# Patient Record
Sex: FEMALE | Race: BLACK OR AFRICAN AMERICAN | NOT HISPANIC OR LATINO | URBAN - METROPOLITAN AREA
[De-identification: names, ages, dates, MRNs, and addresses within clinical notes are randomized per-mention and may not be internally consistent; named-entity substitution may affect disease eponyms.]

---

## 2020-05-13 ENCOUNTER — EMERGENCY (EMERGENCY)
Facility: HOSPITAL | Age: 60
LOS: 1 days | Discharge: HOME | End: 2020-05-13
Attending: EMERGENCY MEDICINE | Admitting: EMERGENCY MEDICINE
Payer: MEDICAID

## 2020-05-13 VITALS
RESPIRATION RATE: 18 BRPM | SYSTOLIC BLOOD PRESSURE: 173 MMHG | OXYGEN SATURATION: 98 % | HEART RATE: 115 BPM | TEMPERATURE: 98 F | DIASTOLIC BLOOD PRESSURE: 102 MMHG | WEIGHT: 160.06 LBS | HEIGHT: 62 IN

## 2020-05-13 LAB
ALBUMIN SERPL ELPH-MCNC: 4.4 G/DL — SIGNIFICANT CHANGE UP (ref 3.3–5)
ALP SERPL-CCNC: 70 U/L — SIGNIFICANT CHANGE UP (ref 40–120)
ALT FLD-CCNC: 44 U/L — SIGNIFICANT CHANGE UP (ref 10–45)
ANION GAP SERPL CALC-SCNC: 14 MMOL/L — SIGNIFICANT CHANGE UP (ref 5–17)
APPEARANCE UR: CLEAR — SIGNIFICANT CHANGE UP
AST SERPL-CCNC: 27 U/L — SIGNIFICANT CHANGE UP (ref 10–40)
BASOPHILS # BLD AUTO: 0.05 K/UL — SIGNIFICANT CHANGE UP (ref 0–0.2)
BASOPHILS NFR BLD AUTO: 0.7 % — SIGNIFICANT CHANGE UP (ref 0–2)
BILIRUB SERPL-MCNC: 0.2 MG/DL — SIGNIFICANT CHANGE UP (ref 0.2–1.2)
BILIRUB UR-MCNC: NEGATIVE — SIGNIFICANT CHANGE UP
BUN SERPL-MCNC: 8 MG/DL — SIGNIFICANT CHANGE UP (ref 7–23)
CALCIUM SERPL-MCNC: 9.9 MG/DL — SIGNIFICANT CHANGE UP (ref 8.4–10.5)
CHLORIDE SERPL-SCNC: 99 MMOL/L — SIGNIFICANT CHANGE UP (ref 96–108)
CO2 SERPL-SCNC: 27 MMOL/L — SIGNIFICANT CHANGE UP (ref 22–31)
COLOR SPEC: YELLOW — SIGNIFICANT CHANGE UP
CREAT SERPL-MCNC: 0.62 MG/DL — SIGNIFICANT CHANGE UP (ref 0.5–1.3)
DIFF PNL FLD: NEGATIVE — SIGNIFICANT CHANGE UP
EOSINOPHIL # BLD AUTO: 0.38 K/UL — SIGNIFICANT CHANGE UP (ref 0–0.5)
EOSINOPHIL NFR BLD AUTO: 5.6 % — SIGNIFICANT CHANGE UP (ref 0–6)
GLUCOSE SERPL-MCNC: 205 MG/DL — HIGH (ref 70–99)
GLUCOSE UR QL: NEGATIVE — SIGNIFICANT CHANGE UP
HCT VFR BLD CALC: 43.9 % — SIGNIFICANT CHANGE UP (ref 34.5–45)
HGB BLD-MCNC: 14.2 G/DL — SIGNIFICANT CHANGE UP (ref 11.5–15.5)
IMM GRANULOCYTES NFR BLD AUTO: 0.3 % — SIGNIFICANT CHANGE UP (ref 0–1.5)
KETONES UR-MCNC: NEGATIVE — SIGNIFICANT CHANGE UP
LEUKOCYTE ESTERASE UR-ACNC: NEGATIVE — SIGNIFICANT CHANGE UP
LYMPHOCYTES # BLD AUTO: 2.87 K/UL — SIGNIFICANT CHANGE UP (ref 1–3.3)
LYMPHOCYTES # BLD AUTO: 42.6 % — SIGNIFICANT CHANGE UP (ref 13–44)
MCHC RBC-ENTMCNC: 29.1 PG — SIGNIFICANT CHANGE UP (ref 27–34)
MCHC RBC-ENTMCNC: 32.3 GM/DL — SIGNIFICANT CHANGE UP (ref 32–36)
MCV RBC AUTO: 90 FL — SIGNIFICANT CHANGE UP (ref 80–100)
MONOCYTES # BLD AUTO: 0.37 K/UL — SIGNIFICANT CHANGE UP (ref 0–0.9)
MONOCYTES NFR BLD AUTO: 5.5 % — SIGNIFICANT CHANGE UP (ref 2–14)
NEUTROPHILS # BLD AUTO: 3.04 K/UL — SIGNIFICANT CHANGE UP (ref 1.8–7.4)
NEUTROPHILS NFR BLD AUTO: 45.3 % — SIGNIFICANT CHANGE UP (ref 43–77)
NITRITE UR-MCNC: NEGATIVE — SIGNIFICANT CHANGE UP
NRBC # BLD: 0 /100 WBCS — SIGNIFICANT CHANGE UP (ref 0–0)
PH UR: 6 — SIGNIFICANT CHANGE UP (ref 5–8)
PLATELET # BLD AUTO: 223 K/UL — SIGNIFICANT CHANGE UP (ref 150–400)
POTASSIUM SERPL-MCNC: 4.2 MMOL/L — SIGNIFICANT CHANGE UP (ref 3.5–5.3)
POTASSIUM SERPL-SCNC: 4.2 MMOL/L — SIGNIFICANT CHANGE UP (ref 3.5–5.3)
PROT SERPL-MCNC: 7.8 G/DL — SIGNIFICANT CHANGE UP (ref 6–8.3)
PROT UR-MCNC: NEGATIVE MG/DL — SIGNIFICANT CHANGE UP
RBC # BLD: 4.88 M/UL — SIGNIFICANT CHANGE UP (ref 3.8–5.2)
RBC # FLD: 13.3 % — SIGNIFICANT CHANGE UP (ref 10.3–14.5)
SODIUM SERPL-SCNC: 140 MMOL/L — SIGNIFICANT CHANGE UP (ref 135–145)
SP GR SPEC: >=1.03 — SIGNIFICANT CHANGE UP (ref 1–1.03)
TROPONIN T SERPL-MCNC: <0.01 NG/ML — SIGNIFICANT CHANGE UP (ref 0–0.01)
UROBILINOGEN FLD QL: 0.2 E.U./DL — SIGNIFICANT CHANGE UP
WBC # BLD: 6.73 K/UL — SIGNIFICANT CHANGE UP (ref 3.8–10.5)
WBC # FLD AUTO: 6.73 K/UL — SIGNIFICANT CHANGE UP (ref 3.8–10.5)

## 2020-05-13 PROCEDURE — 93010 ELECTROCARDIOGRAM REPORT: CPT

## 2020-05-13 PROCEDURE — 99285 EMERGENCY DEPT VISIT HI MDM: CPT | Mod: 25

## 2020-05-13 PROCEDURE — 74019 RADEX ABDOMEN 2 VIEWS: CPT | Mod: 26

## 2020-05-13 PROCEDURE — 71045 X-RAY EXAM CHEST 1 VIEW: CPT | Mod: 26

## 2020-05-13 RX ORDER — FAMOTIDINE 10 MG/ML
20 INJECTION INTRAVENOUS ONCE
Refills: 0 | Status: COMPLETED | OUTPATIENT
Start: 2020-05-13 | End: 2020-05-13

## 2020-05-13 RX ORDER — ACETAMINOPHEN 500 MG
650 TABLET ORAL ONCE
Refills: 0 | Status: COMPLETED | OUTPATIENT
Start: 2020-05-13 | End: 2020-05-13

## 2020-05-13 NOTE — ED ADULT NURSE NOTE - NSIMPLEMENTINTERV_GEN_ALL_ED
Insulin pump will be resumed tomorrow at 8am with a temporary basal of 80% for 6 hours. Insulin pump forms to be completed by pt and attestation form to be signed by physician  S/S, prevention and appropriate treatment of hypo-hyperglycemia reviewed with pt and verbalized understanding obtained via the teach-back method.  Once insulin pump is resumes Primary RN to f/u with BG monitoring, making sure pt consumes carb consistent meals, insulin pump site assessment (once insulin pump resumed), monitoring S/S hypo/hyperglycemia and tracking carb intake, meal/correction boluses (once pump resumed). Implemented All Universal Safety Interventions:  [place] to call system. Call bell, personal items and telephone within reach. Instruct patient to call for assistance. Room bathroom lighting operational. Non-slip footwear when patient is off stretcher. Physically safe environment: no spills, clutter or unnecessary equipment. Stretcher in lowest position, wheels locked, appropriate side rails in place.

## 2020-05-13 NOTE — ED PROVIDER NOTE - CARE PLAN
Principal Discharge DX:	Chest pain Principal Discharge DX:	Chest pain  Secondary Diagnosis:	Hypertension

## 2020-05-13 NOTE — ED ADULT NURSE NOTE - OBJECTIVE STATEMENT
58 yo F pmhx asthma, DM, GERD sent to ED from  co chest discomfort/ epigastric burning since this AM. Pt reports intermittent epigastric pain radiating to chest discomfort intermittently, "for a while now I have gerd and sometimes take pepcid sometimes don't but I didn't know what this was." EKG completed upon arrival. Pt received 2tabs of 81mg ASA PTA via , and upon arrival describes 2/10 chest discomfort stating that pain has mostly resolved. PT also reports "using my inhaler and nebulizer so much since covid because im scared." Pt AOx4, speaking in clear and coherent sentences, ambulates with steady gait from front triage. placed on [facility], labs collected. Denies SOB, recent fevers, chills, N/V/D, back pain, neck pain, headache, dizziness, blurred vision, exposure to known sick contacts.

## 2020-05-13 NOTE — ED ADULT NURSE NOTE - CHIEF COMPLAINT QUOTE
Presents to ED for chest discomfort this AM.  States pain was located sternally which she was seen at [facility] today.  Was given 2x 81mg ASA prior to arrival.

## 2020-05-13 NOTE — ED ADULT NURSE NOTE - NEURO GAIT
HPI     Blur ou at dist/near x mos, no assoc pain or red, constant, no relief   over time.  Using systane balance 2x/day    Last edited by Tone Diaz, OD on 11/5/2018  1:21 PM. (History)            Assessment /Plan     For exam results, see Encounter Report.    Astigmatism with presbyopia, bilateral    Dry eyes, bilateral    Nuclear sclerosis, bilateral    Open angle with borderline findings and low glaucoma risk in both eyes      1. Spec Rx given. Different lens options discussed with patient. RTC 1 year full exam.  2. Cont with systane balance.  3. Educated pt on presence of cataracts and effects on vision. No surgery at this time. Recheck in one year.  4. IOP low and fine for CD, pt does not want any testing that is not covered by insurance, CD ratio stable, pachy normal, no fam history of glaucoma. RTC yearly.              
steady

## 2020-05-13 NOTE — ED ADULT TRIAGE NOTE - CHIEF COMPLAINT QUOTE
Presents to ED for chest discomfort this AM.  States pain was located sternally which she was seen at [facility] today.  Was given 2x 81mg ASA prior to arrival.

## 2020-05-13 NOTE — ED PROVIDER NOTE - CLINICAL SUMMARY MEDICAL DECISION MAKING FREE TEXT BOX
Pt with chest pain, now resolved. Will check labs, CXR, EKG. Re-assess. Pt with chest pain, now resolved. Will check labs, CXR, EKG. Pepcid ordered- pt declined in ED. Re-assess.  On re-evaluation, pt is AAox3 and in NAD. Pt well appearing in ED.   Pt discloses she was given a bowel regimen for her constipation, however has not started yet. Pt expresses concern due to rectal pressure and intermittent lower back pain. Abdominal xray done- large amount of stool, nonobstructive pattern. Pt has an appointment tomorrow with GI and Friday with pmd. Instructions given to keep appointments, return to ED for worsening condition. Pt expresses understanding.

## 2020-05-13 NOTE — ED PROVIDER NOTE - OBJECTIVE STATEMENT
Pt is a 59F who presents to ED for chest discomfort. Pt states she has been having generalized chest discomfort for the past day. Pt states she has reflux and was unsure if her symptoms were related to reflux or cardiac pain. Pt states she has had chest pain in the past, had nuclear stress test and echo 1 year ago which was normal. Pt was seen at urgent care and sent to ED for evaluation. Pt has hx of asthma and diabetes. Pt states she has been taking her albuterol at least 3 times a day for the past 3 days due to worry over coronavirus. Pt has no shortness of breath, no wheezing. Pt speaking in full sentences, in NAD.

## 2020-05-13 NOTE — ED PROVIDER NOTE - PATIENT PORTAL LINK FT
You can access the FollowMyHealth Patient Portal offered by [facility] by registering at the following website: [email or web address]. By joining [facility]’s FollowMyHealth portal, you will also be able to view your health information using other applications (apps) compatible with our system.

## 2020-05-13 NOTE — ED PROVIDER NOTE - NSFOLLOWUPINSTRUCTIONS_ED_ALL_ED_FT
Chest Pain    Chest pain can be caused by many different conditions which may or may not be dangerous. Causes include heartburn, lung infections, heart attack, blood clot in lungs, skin infections, strain or damage to muscle, cartilage, or bones, etc. In addition to a history and physical examination, an electrocardiogram (ECG) or other lab tests may have been performed to determine the cause of your chest pain. Follow up with your primary care provider or with a cardiologist as instructed.     SEEK IMMEDIATE MEDICAL CARE IF YOU HAVE ANY OF THE FOLLOWING SYMPTOMS: worsening chest pain, coughing up blood, unexplained back/neck/jaw pain, severe abdominal pain, dizziness or lightheadedness, fainting, shortness of breath, sweaty or clammy skin, vomiting, or racing heart beat. These symptoms may represent a serious problem that is an emergency. Do not wait to see if the symptoms will go away. Get medical help right away. Call 911 and do not drive yourself to the hospital.    Gastroesophageal Reflux Disease, Adult    Gastroesophageal reflux (GO) happens when acid from the stomach flows up into the tube that connects the mouth and the stomach (esophagus). Normally, food travels down the esophagus and stays in the stomach to be digested. However, when a person has GO, food and stomach acid sometimes move back up into the esophagus. If this becomes a more serious problem, the person may be diagnosed with a disease called gastroesophageal reflux disease (GERD). GERD occurs when the reflux:  Happens often. Causes frequent or severe symptoms. Causes problems such as damage to the esophagus.When stomach acid comes in contact with the esophagus, the acid may cause soreness (inflammation) in the esophagus. Over time, GERD may create small holes (ulcers) in the lining of the esophagus.  What are the causes?  This condition is caused by a problem with the muscle between the esophagus and the stomach (lower esophageal sphincter, or LES). Normally, the LES muscle closes after food passes through the esophagus to the stomach. When the LES is weakened or abnormal, it does not close properly, and that allows food and stomach acid to go back up into the esophagus.  The LES can be weakened by certain dietary substances, medicines, and medical conditions, including:  Tobacco use.Pregnancy.Having a hiatal hernia.Alcohol use.Certain foods and beverages, such as coffee, chocolate, onions, and peppermint.What increases the risk?  You are more likely to develop this condition if you:  Have an increased body weight.Have a connective tissue disorder.Use NSAID medicines.What are the signs or symptoms?  Symptoms of this condition include:  Heartburn.Difficult or painful swallowing.The feeling of having a lump in the throat.A bitter taste in the mouth.Bad breath.Having a large amount of saliva.Having an upset or bloated stomach.Belching.Chest pain. Different conditions can cause chest pain. Make sure you see your health care provider if you experience chest pain.Shortness of breath or wheezing.Ongoing (chronic) cough or a night-time cough.Wearing away of tooth enamel.Weight loss.How is this diagnosed?  Your health care provider will take a medical history and perform a physical exam. To determine if you have mild or severe GERD, your health care provider may also monitor how you respond to treatment. You may also have tests, including:  A test to examine your stomach and esophagus with a small camera (endoscopy).A test that measures the acidity level in your esophagus.A test that measures how much pressure is on your esophagus.A barium swallow or modified barium swallow test to show the shape, size, and functioning of your esophagus.How is this treated?  The goal of treatment is to help relieve your symptoms and to prevent complications. Treatment for this condition may vary depending on how severe your symptoms are. Your health care provider may recommend:  Changes to your diet.Medicine.Surgery.Follow these instructions at home:  Eating and drinking        Follow a diet as recommended by your health care provider. This may involve avoiding foods and drinks such as:  Coffee and tea (with or without caffeine).Drinks that contain alcohol.Energy drinks and sports drinks.Carbonated drinks or sodas.Chocolate and cocoa.Peppermint and mint flavorings.Garlic and onions.Horseradish.Spicy and acidic foods, including peppers, chili powder, vernon powder, vinegar, hot sauces, and barbecue sauce.Citrus fruit juices and citrus fruits, such as oranges, giselle, and limes.Tomato-based foods, such as red sauce, chili, salsa, and pizza with red sauce.Fried and fatty foods, such as donuts, french fries, potato chips, and high-fat dressings.High-fat meats, such as hot dogs and fatty cuts of red and white meats, such as rib eye steak, sausage, ham, and talavera.High-fat dairy items, such as whole milk, butter, and cream cheese.Eat small, frequent meals instead of large meals.Avoid drinking large amounts of liquid with your meals.Avoid eating meals during the 2–3 hours before bedtime.Avoid lying down right after you eat.Do not exercise right after you eat.Lifestyle        Do not use any products that contain nicotine or tobacco, such as cigarettes, e-cigarettes, and chewing tobacco. If you need help quitting, ask your health care provider.Try to reduce your stress by using methods such as yoga or meditation. If you need help reducing stress, ask your health care provider.If you are overweight, reduce your weight to an amount that is healthy for you. Ask your health care provider for guidance about a safe weight loss goal.General instructions     Pay attention to any changes in your symptoms.Take over-the-counter and prescription medicines only as told by your health care provider. Do not take aspirin, ibuprofen, or other NSAIDs unless your health care provider told you to do so.Wear loose-fitting clothing. Do not wear anything tight around your waist that causes pressure on your abdomen.Raise (elevate) the head of your bed about 6 inches (15 cm).Avoid bending over if this makes your symptoms worse.Keep all follow-up visits as told by your health care provider. This is important.Contact a health care provider if:  You have:  New symptoms.Unexplained weight loss.Difficulty swallowing or it hurts to swallow.Wheezing or a persistent cough.A hoarse voice.Your symptoms do not improve with treatment.Get help right away if you:  Have pain in your arms, neck, jaw, teeth, or back.Feel sweaty, dizzy, or light-headed.Have chest pain or shortness of breath.Vomit and your vomit looks like blood or coffee grounds.Faint.Have stool that is bloody or black.Cannot swallow, drink, or eat.Summary  Gastroesophageal reflux happens when acid from the stomach flows up into the esophagus. GERD is a disease in which the reflux happens often, causes frequent or severe symptoms, or causes problems such as damage to the esophagus.Treatment for this condition may vary depending on how severe your symptoms are. Your health care provider may recommend diet and lifestyle changes, medicine, or surgery.Contact a health care provider if you have new or worsening symptoms.Take over-the-counter and prescription medicines only as told by your health care provider. Do not take aspirin, ibuprofen, or other NSAIDs unless your health care provider told you to do so.Keep all follow-up visits as told by your health care provider. This is important.This information is not intended to replace advice given to you by your health care provider. Make sure you discuss any questions you have with your health care provider.    Document Released: 09/27/2006 Document Revised: 06/26/2019 Document Reviewed: 06/26/2019  Elsevier Patient Education © 2020 [employer]    Hypertension    Hypertension, commonly called high blood pressure, is when the force of blood pumping through your arteries is too strong. Hypertension forces your heart to work harder to pump blood. Your arteries may become narrow or stiff. Having untreated or uncontrolled hypertension for a long period of time can cause heart attack, stroke, kidney disease, and other problems. If started on a medication, take exactly as prescribed by your health care professional. Maintain a healthy lifestyle and follow up with your primary care physician.    SEEK IMMEDIATE MEDICAL CARE IF YOU HAVE ANY OF THE FOLLOWING SYMPTOMS: severe headache, confusion, chest pain, abdominal pain, vomiting, or shortness of breath.

## 2020-05-14 VITALS
TEMPERATURE: 98 F | DIASTOLIC BLOOD PRESSURE: 64 MMHG | RESPIRATION RATE: 18 BRPM | SYSTOLIC BLOOD PRESSURE: 176 MMHG | HEART RATE: 105 BPM | OXYGEN SATURATION: 98 %

## 2020-05-14 PROCEDURE — 80053 COMPREHEN METABOLIC PANEL: CPT

## 2020-05-14 PROCEDURE — 83690 ASSAY OF LIPASE: CPT

## 2020-05-14 PROCEDURE — 82550 ASSAY OF CK (CPK): CPT

## 2020-05-14 PROCEDURE — 82553 CREATINE MB FRACTION: CPT

## 2020-05-14 PROCEDURE — 93005 ELECTROCARDIOGRAM TRACING: CPT

## 2020-05-14 PROCEDURE — 74019 RADEX ABDOMEN 2 VIEWS: CPT

## 2020-05-14 PROCEDURE — 81003 URINALYSIS AUTO W/O SCOPE: CPT

## 2020-05-14 PROCEDURE — 74019 RADEX ABDOMEN 2 VIEWS: CPT | Mod: 26

## 2020-05-14 PROCEDURE — 71045 X-RAY EXAM CHEST 1 VIEW: CPT

## 2020-05-14 PROCEDURE — 85025 COMPLETE CBC W/AUTO DIFF WBC: CPT

## 2020-05-14 PROCEDURE — 84484 ASSAY OF TROPONIN QUANT: CPT

## 2020-05-14 PROCEDURE — 36415 COLL VENOUS BLD VENIPUNCTURE: CPT

## 2020-05-14 PROCEDURE — 99284 EMERGENCY DEPT VISIT MOD MDM: CPT | Mod: 25

## 2020-05-14 NOTE — ED ADULT NURSE REASSESSMENT NOTE - NS ED NURSE REASSESS COMMENT FT1
Patient returns from XRay denying any pain or changes in sx. in NAD at this time, connected to [facility].

## 2020-05-17 DIAGNOSIS — R07.89 OTHER CHEST PAIN: ICD-10-CM

## 2020-05-17 DIAGNOSIS — I10 ESSENTIAL (PRIMARY) HYPERTENSION: ICD-10-CM

## 2020-05-29 ENCOUNTER — EMERGENCY (EMERGENCY)
Facility: HOSPITAL | Age: 60
LOS: 1 days | Discharge: HOME | End: 2020-05-29
Attending: EMERGENCY MEDICINE | Admitting: EMERGENCY MEDICINE
Payer: MEDICAID

## 2020-05-29 VITALS
HEIGHT: 65 IN | DIASTOLIC BLOOD PRESSURE: 94 MMHG | TEMPERATURE: 98 F | HEART RATE: 100 BPM | WEIGHT: 156.09 LBS | RESPIRATION RATE: 16 BRPM | OXYGEN SATURATION: 98 % | SYSTOLIC BLOOD PRESSURE: 156 MMHG

## 2020-05-29 VITALS
TEMPERATURE: 98 F | DIASTOLIC BLOOD PRESSURE: 87 MMHG | OXYGEN SATURATION: 99 % | SYSTOLIC BLOOD PRESSURE: 147 MMHG | RESPIRATION RATE: 18 BRPM | HEART RATE: 89 BPM

## 2020-05-29 DIAGNOSIS — M54.2 CERVICALGIA: ICD-10-CM

## 2020-05-29 DIAGNOSIS — Z88.8 ALLERGY STATUS TO OTHER DRUGS, MEDICAMENTS AND BIOLOGICAL SUBSTANCES STATUS: ICD-10-CM

## 2020-05-29 DIAGNOSIS — N75.0 CYST OF BARTHOLIN'S GLAND: ICD-10-CM

## 2020-05-29 DIAGNOSIS — M79.10 MYALGIA, UNSPECIFIED SITE: ICD-10-CM

## 2020-05-29 PROBLEM — J45.909 UNSPECIFIED ASTHMA, UNCOMPLICATED: Chronic | Status: ACTIVE | Noted: 2020-05-13

## 2020-05-29 PROBLEM — E11.9 TYPE 2 DIABETES MELLITUS WITHOUT COMPLICATIONS: Chronic | Status: ACTIVE | Noted: 2020-05-13

## 2020-05-29 LAB
APPEARANCE UR: CLEAR — SIGNIFICANT CHANGE UP
BILIRUB UR-MCNC: NEGATIVE — SIGNIFICANT CHANGE UP
COLOR SPEC: YELLOW — SIGNIFICANT CHANGE UP
DIFF PNL FLD: NEGATIVE — SIGNIFICANT CHANGE UP
GLUCOSE UR QL: NEGATIVE — SIGNIFICANT CHANGE UP
KETONES UR-MCNC: NEGATIVE — SIGNIFICANT CHANGE UP
LEUKOCYTE ESTERASE UR-ACNC: NEGATIVE — SIGNIFICANT CHANGE UP
NITRITE UR-MCNC: NEGATIVE — SIGNIFICANT CHANGE UP
PH UR: 6 — SIGNIFICANT CHANGE UP (ref 5–8)
PROT UR-MCNC: NEGATIVE MG/DL — SIGNIFICANT CHANGE UP
SP GR SPEC: 1.02 — SIGNIFICANT CHANGE UP (ref 1–1.03)
UROBILINOGEN FLD QL: 0.2 E.U./DL — SIGNIFICANT CHANGE UP

## 2020-05-29 PROCEDURE — 88304 TISSUE EXAM BY PATHOLOGIST: CPT

## 2020-05-29 PROCEDURE — 87086 URINE CULTURE/COLONY COUNT: CPT

## 2020-05-29 PROCEDURE — 88304 TISSUE EXAM BY PATHOLOGIST: CPT | Mod: 26

## 2020-05-29 PROCEDURE — 87075 CULTR BACTERIA EXCEPT BLOOD: CPT

## 2020-05-29 PROCEDURE — 87186 SC STD MICRODIL/AGAR DIL: CPT

## 2020-05-29 PROCEDURE — 87070 CULTURE OTHR SPECIMN AEROBIC: CPT

## 2020-05-29 PROCEDURE — 81003 URINALYSIS AUTO W/O SCOPE: CPT

## 2020-05-29 PROCEDURE — 99284 EMERGENCY DEPT VISIT MOD MDM: CPT | Mod: 25

## 2020-05-29 PROCEDURE — 99284 EMERGENCY DEPT VISIT MOD MDM: CPT

## 2020-05-29 NOTE — ED PROVIDER NOTE - PATIENT PORTAL LINK FT
You can access the FollowMyHealth Patient Portal offered by [facility] by registering at the following website: [email or web address]. By joining [facility]’s FollowMyHealth portal, you will also be able to view your health information using other applications (apps) compatible with our system.

## 2020-05-29 NOTE — ED PROVIDER NOTE - ATTENDING CONTRIBUTION TO CARE
discomfort in left side of neck after sleeping funny and painful swelling on vagina.  neck pain appears musculoskeletal.  reproducible with movement/ palpation.  concern for bartholin abscess.  gyn consulted and performed drainage.  home with abx, outpatient f/u.  no systemic symptoms of infection

## 2020-05-29 NOTE — CONSULT NOTE ADULT - SUBJECTIVE AND OBJECTIVE BOX
59y  Menopausal since 2015 presenting with neck pain but mentioned "swelling in her vagina".  GYN team was called to assess a Right Bartholin's cyst.  she report the cyst started growing a few days prior to her visit. Last week was treated with Bactrim for presumed UTI (but cultures came back negative).   Her D.M II is only partial controlled.  She's endorses pain, but denies fever, chills, chest pain, palpitations, SOB, n/v.  +flatus, +BM    OB H/x:     x3  vTOP w/ D&C   MA     GYN H/x:  Prior episode of Bartholin's cyst with spontaneous drainge few years ago.    MED H/x:  Diabetes mellitus )last Hb A1c - 10.4% (2020).  Asthma  GERD    SURG H/x:    Medications:  Insulin  Abuterol  Pepcid    Allergies:   NKDA    Vital Signs Last 24 Hrs  T(C): 36.6 (29 May 2020 15:13), Max: 36.7 (29 May 2020 11:59)  T(F): 97.9 (29 May 2020 15:13), Max: 98 (29 May 2020 11:59)  HR: 89 (29 May 2020 15:13) (89 - 100)  BP: 147/87 (29 May 2020 15:13) (147/87 - 156/94)  RR: 18 (29 May 2020 15:13) (16 - 18)  SpO2: 99% (29 May 2020 15:13) (98% - 99%)    Physical Exam:  Gen: NAD, comfortable  GI: soft, nontender, nondistended + BS, no rebound no guarding  BME: normal anteverted uterus, no adnexal masses appreciated , No cervical motion tenderness   Right Bartholin's cyst/abscess 3-4cm very tender to touch, no pigmentation or spontaneous drainage.   Ext: no edema, erythema, tenderness       Urinalysis Basic - ( 29 May 2020 13:55 )    Color: Yellow / Appearance: Clear / S.020 / pH: x  Gluc: x / Ketone: NEGATIVE  / Bili: Negative / Urobili: 0.2 E.U./dL   Blood: x / Protein: NEGATIVE mg/dL / Nitrite: NEGATIVE   Leuk Esterase: NEGATIVE / RBC: x / WBC x   Sq Epi: x / Non Sq Epi: x / Bacteria: x

## 2020-05-29 NOTE — ED PROVIDER NOTE - OBJECTIVE STATEMENT
59F w/hx of HTN, DM, asthma here for evaluation of left sided neck pain and "bump" in my vagina.  Patient states she slept on several large pillows and may have "pulled" her neck but wanted to make sure "I am ok." Denies any headache, visual disturbances, stiff neck, fever, chills, chest pain, shortness of breath. She has taken ibuprofen to some relief.  Also c/o painful, enlarging bump on vagina for months. States she has felt this but as it was increasing in size she became worried. She has called her GYN office but was unable to secure an appointment.

## 2020-05-29 NOTE — ED PROVIDER NOTE - NSFOLLOWUPINSTRUCTIONS_ED_ALL_ED_FT
TAKE ANTIBIOTICS AS PRESCRIBED AND USE TYLENOL AS NEEDED FOR PAIN.  PLEASE FOLLOW UP WITH YOUR GYN IN 7-10 DAYS.    A Bartholin's cyst is a fluid-filled sac that forms on a Bartholin's gland. Bartholin's glands are small glands in the folds of skin around the vaginal opening (labia). These glands produce a fluid to moisten (lubricate) the outside of the vagina during sex.  A cyst that is not large or infected may not cause any problems or require treatment. If the cyst gets infected, it is called a Bartholin's abscess. An abscess may cause symptoms such as pain and swelling and is more likely to require treatment.  What are the causes?  This condition may be caused by a blocked Bartholin's gland. These glands can become blocked due to natural buildup of fluid and oils. Bacteria inside of the cyst can cause infection.  In many cases, the cause is not known.  What increases the risk?  You may be at increased risk of developing a Bartholin's cyst or abscess if:  You are of childbearing age.You have a history of Bartholin's cysts or abscesses.You have diabetes.You have an STI (sexually transmitted infection).What are the signs or symptoms?  Symptoms may include:  A bulge or lump on the labia, near the lower opening of the vagina.Discomfort or pain. This may get worse during sex or when walking.Redness, swelling, or fluid draining from the area. These may be signs of an abscess.How severe your symptoms are depends on the size of your cyst and whether it is infected. Infection causes symptoms to get more severe.  How is this diagnosed?  This condition may be diagnosed based on:  Your symptoms and medical history.A physical exam to check for swelling in your vaginal area. You may lie on your back on an exam table and have your feet placed into footrests for the exam.Blood tests to check for infections.Removal of a fluid sample from the cyst or abscess (biopsy) for testing.You may work with a health care provider who specializes in women's health (gynecologist) for diagnosis and treatment.  How is this treated?  If your cyst is small, not infected, and not causing symptoms, you may not need any treatment. These cysts often go away on their own, with home care such as hot baths or warm compresses.  If you have a large cyst or an abscess, treatment may include:  Antibiotic medicine.A procedure to drain the fluid inside the cyst or abscess. These procedures involve making an incision in the cyst or abscess so that the fluid drains out, and then one of the following may be done:  A small, thin tube (catheter) may be placed inside the cyst or abscess so that it does not close and fill up with fluid again (fistulization). The catheter will be removed at a follow-up visit.The edges of the incision may be stitched to your skin so that the cyst or abscess stays open (marsupialization). This allows it to continue to drain and not fill up with fluid again.If you have cysts or abscesses that keep returning (recurring) and have required incision and drainage multiple times, your health care provider may talk with you about surgery to remove the Bartholin's gland.  Follow these instructions at home:  Medicines     Take over-the-counter and prescription medicines only as told by your health care provider.If you were prescribed an antibiotic medicine, take it as told by your health care provider. Do not stop taking the antibiotic even if your condition improves.Managing pain and swelling     Try sitz baths to help with pain and swelling. A sitz bath is a warm water bath in which the water only comes up to your hips and should cover your buttocks. You may take sitz baths several times a day.Apply heat to the affected area as often as needed. Use the heat source that your health care provider recommends, such as a moist heat pack or a heating pad.   Place a towel between your skin and the heat source. Leave the heat on for 20–30 minutes. Remove the heat if your skin turns bright red. This is especially important if you are unable to feel pain, heat, or cold. You may have a greater risk of getting burned.General instructions     If your cyst or abscess was drained, follow instructions from your health care provider about how to take care of your wound. Use feminine pads as needed to absorb any drainage.Do not push on or squeeze your cyst.Do not have sex until the cyst has gone away or your wound from drainage has healed.Take these steps to help prevent a Bartholin's cyst from returning, and to prevent other Bartholin's cysts from developing:  Take a bath or shower once a day. Clean your vaginal area with mild soap and water when you bathe.Practice safe sex to prevent STIs. Talk with your health care provider about how to prevent STIs and which forms of birth control (contraception) may be best for you.Keep all follow-up visits as told by your health care provider. This is important.Contact a health care provider if:  You have a fever.You develop redness, swelling, or pain around your cyst.You have fluid, blood, pus, or a bad smell coming from your cyst.You have a cyst that gets larger or comes back.Summary  A Bartholin's cyst is a fluid-filled sac that forms on a Bartholin's gland. These glands are in the folds of skin around the vaginal opening (labia).If your cyst is small, not infected, and not causing symptoms, you may not need any treatment. These cysts often go away on their own, with home care such as hot baths or warm compresses.If you have a large cyst or an abscess, your health care provider may perform a procedure to drain the fluid.If you have cysts or abscesses that keep returning (recurring) and have required incision and drainage multiple times, your health care provider may talk with you about surgery to remove the Bartholin's gland.This information is not intended to replace advice given to you by your health care provider. Make sure you discuss any questions you have with your health care provider.

## 2020-05-29 NOTE — ED PROVIDER NOTE - CHPI ED SYMPTOMS NEG
no nausea/no numbness/no tingling/no dizziness/no chills/no decreased eating/drinking/no weakness/no fever/no vomiting

## 2020-05-29 NOTE — ED PROVIDER NOTE - CLINICAL SUMMARY MEDICAL DECISION MAKING FREE TEXT BOX
59F w/hx of HTN, DM, asthma here for evaluation of left sided neck pain and "bump" in my vagina. Neck pain is most likely musculoskeletal in nature - patient declining pain medication in ED.  R bartholin gland cyst/abscess- GYN consulted, see note. Recommend RX for augmentin, will follow culture. Patient was encouraged to f/u with her GYN.    I have discussed the discharge plan with the patient. The patient agrees with the plan, as discussed.  The patient understands Emergency Department diagnosis is a preliminary diagnosis often based on limited information and that the patient must adhere to the follow-up plan as discussed.  The patient understands that if the symptoms worsen or if prescribed medications do not have the desired/planned effect that the patient must/may return to the closest Emergency Department at any time for further evaluation and treatment.

## 2020-05-29 NOTE — ED PROVIDER NOTE - CARE PLAN
Principal Discharge DX:	Bartholin cyst Principal Discharge DX:	Bartholin cyst  Secondary Diagnosis:	Neck pain, musculoskeletal

## 2020-05-29 NOTE — ED ADULT NURSE NOTE - OBJECTIVE STATEMENT
Patient presents to the ED c/o pain to the right side of the neck. Patient states that she woke with right sided neck pain "probably because my daughter just bought me new pillows". Patient presents with free and fluid movement to all extremities, speech clear and fluent, admitting to anxiety, ambulatory without neuro deficits, and denying pain at this time. Pt. states that she has an echocardiogram and close CARDS and PCP f/u within recent weeks. Pt. reports when neck pain "comes" it goes into her right shoulder and right chest. Pt. continues to deny pain to this RN and is "just here to be really sure"

## 2020-05-29 NOTE — ED ADULT NURSE NOTE - CHPI ED NUR SYMPTOMS NEG
no dizziness/no fever/no loss of consciousness/no blurred vision/no confusion/no nausea/no numbness/no vomiting/no weakness/no change in level of consciousness

## 2020-05-29 NOTE — CONSULT NOTE ADULT - ASSESSMENT
59y  Menopausal since  presenting with Bartholin's abscess.  During her stay in the ED the abscess was drained with moderate amount of pus fluid. Cutlure AND biopsy were sent (f/u results).  She will be discharged with PO Augmentin 875mg BID for 10 days.    - No further GYN intervention indicated at this time.    - Patient is hemodynamically stable, feeling overall well and may be discharged from a GYN perspective.   - Strict return precautions given: severe pain, heavy bleeding; strict pelvic rest.     Patient evaluated at bedside with Dr. Liriano (PGY-4) and with OB-GYN attending Dr. Laurent.

## 2020-05-30 LAB
CULTURE RESULTS: SIGNIFICANT CHANGE UP
SPECIMEN SOURCE: SIGNIFICANT CHANGE UP

## 2020-06-01 RX ORDER — AZTREONAM 2 G
1 VIAL (EA) INJECTION
Qty: 14 | Refills: 0
Start: 2020-06-01 | End: 2020-06-07

## 2020-06-04 LAB — SURGICAL PATHOLOGY STUDY: SIGNIFICANT CHANGE UP

## 2020-06-12 NOTE — ED PROVIDER NOTE - CHIEF COMPLAINT
The patient is a 59y Female complaining of chest discomfort. Normal rate, regular rhythm.  Heart sounds S1, S2.  No murmurs, rubs or gallops.

## 2021-11-01 NOTE — ED ADULT TRIAGE NOTE - BANDS:
11/1/2021 1038 CM note; Negative covid test 10/29/21. [facility] on [facility] [place] and does NOT have home o2. IF home o2 is needed, pt prefers [facility]. WILL NEED POX testing documentation/o2 Rx. Per gi notes, TIPS procedure can be done as outpt. CM/SW to follow for o2 needs. Pt on xifaxin-WILL NEED Rx for prior auth/copay.  Rosa M CARTER Name band;

## 2021-11-30 NOTE — ED PROVIDER NOTE - DIAGNOSTIC INTERPRETATION
MAY Haji  11/30/2021  1:08 PM  Sign when Signing Visit    MEDICARE WELLNESS VISIT + NOTE    CHIEF COMPLAINT:  Nicci Lundy presents for her Subsequent Annual Medicare Wellness Visit.   Her additional complaints or concerns are addressed below.      Patient Care Team: Dejah OLVERA, MAY Nair CMA as PCP - General (Nurse Practitioner)  Kang Vicente MD ophthalmology        Patient Active Problem List   Diagnosis   • Mixed hyperlipidemia   • Hearing loss due to cerumen impaction, right   • Overweight (BMI 25.0-29.9)   • History of vitamin D deficiency         Past Medical History:   Diagnosis Date   • Arthritis     bilateral knees   • Ganglion cyst     left foot   • HLD (hyperlipidemia)    • Macular degeneration disease    • Malignant neoplasm (CMS/HCC)     basal cell skin cancer         Past Surgical History:   Procedure Laterality Date   • Colonoscopy           Social History     Tobacco Use   • Smoking status: Never Smoker   • Smokeless tobacco: Never Used   Substance Use Topics   • Alcohol use: Yes     Alcohol/week: 2.0 standard drinks     Types: 2 Glasses of wine per week   • Drug use: Never     Family History   Problem Relation Age of Onset   • Cancer, Lung Mother    • Cancer, Colon Paternal Aunt          Current Outpatient Medications   Medication Sig Dispense Refill   • cephalexin (KEFLEX) 500 MG capsule      • atorvastatin (LIPITOR) 20 MG tablet Take 1 tablet by mouth daily. 90 tablet 3     No current facility-administered medications for this visit.        The following items on the Medicare Health Risk Assessment were found to be positive  6 a.) How many servings of Fruits and Vegetables do you have each day ( 1 serving = 1 piece of fruit, 1/2 cup fruits or vegetables): 1 per day     6 b.) How many servings of High Fiber / Whole Grain Foods to you have each day ( 1 serving = 1 cup cold cereal, 1/2 cup cooked cereal, 1 slice bread): 1 per day         Vision and Hearing  screens:    Hearing Screening    125Hz 250Hz 500Hz 1000Hz 2000Hz 3000Hz 4000Hz 6000Hz 8000Hz   Right ear:            Left ear:            Comments: Finger rub on left side. Could hear very little.  Finger rub on right side. Could not hear at all    Vision Screening Comments: Last eye exam 11/16/2021. With Dr. Kang Vicente      Advance Directive:   The patient has the following documents:  No Advance Directives on file. Patient offered documents.    Cognitive/Functional Status: no evidence of cognitive dysfunction by direct observation    Opioid Review: Nicci is not taking opioid medications.    Recent PHQ 2/9 Score:    PHQ 2:  Date Adult PHQ 2 Score Adult PHQ 2 Interpretation   11/30/2021 0 No further screening needed       PHQ 9:       DEPRESSION ASSESSMENT/PLAN:  Depression screening is negative no further plan needed.     Body mass index is 26.74 kg/m².    BMI ASSESSMENT/PLAN:  Patient is overweight.    30 minutes of physical activity a day        See Patient Instructions section.   Return in about 1 year (around 11/30/2022) for Medicare Wellness Visit.      OUTPATIENT PROGRESS NOTE    Subjective   Chief Complaint Medicare Wellness Visit    Patient presents for Subsequent Medicare Wellness Exam, fasting lab work and to address her hyperlipidemia. She is compliant with taking Atorvastatin 20 mg/day. 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    Hyperlipidemia  This is a chronic problem. The problem is controlled. Associated symptoms include leg pain (bilateral knees  pain). Pertinent negatives include no chest pain, focal sensory loss, focal weakness, myalgias or shortness of breath. Current antihyperlipidemic treatment includes statins. The current treatment provides significant improvement of lipids. Compliance problems include adherence to diet.  Risk factors for coronary artery disease include a sedentary lifestyle, post-menopausal and dyslipidemia.         Medications  Medications were reviewed and updated today with patient by memory    Histories      Review of Systems   Constitutional: Negative for activity change, appetite change, chills, diaphoresis, fatigue, fever and unexpected weight change.   HENT: Positive for hearing loss (right ear). Negative for congestion, dental problem, drooling, ear discharge, ear pain, facial swelling, postnasal drip, rhinorrhea, sinus pressure, sinus pain, sneezing, sore throat, tinnitus, trouble swallowing and voice change.    Eyes: Positive for redness (right eye).        Right eye stye   Respiratory: Negative for apnea, cough, choking, chest tightness, shortness of breath, wheezing and stridor.    Cardiovascular: Negative for chest pain, palpitations and leg swelling.   Gastrointestinal: Negative for abdominal distention, abdominal pain, anal bleeding, blood in stool, constipation, diarrhea, nausea, rectal pain and vomiting.   Endocrine: Negative for cold intolerance, heat intolerance, polydipsia, polyphagia and polyuria.   Genitourinary: Negative for difficulty urinating, dysuria, frequency and urgency.   Musculoskeletal: Positive for arthralgias (bilateral knees). Negative for back pain, gait problem, myalgias, neck pain and neck stiffness.   Allergic/Immunologic: Negative for environmental allergies, food allergies and immunocompromised state.   Neurological: Negative for dizziness, tremors, focal weakness, seizures, syncope, facial asymmetry, speech difficulty, weakness, light-headedness, numbness and headaches.   Hematological: Negative for adenopathy.   Psychiatric/Behavioral: Negative for agitation, behavioral problems, confusion, decreased concentration, dysphoric mood, hallucinations, self-injury and sleep disturbance. The patient is not nervous/anxious and is not hyperactive.        Objective   Visit Vitals  /79 (BP Location: LUE - Left upper extremity, Patient Position: Sitting, Cuff Size: Regular)   Pulse 73    Temp 98.7 °F (37.1 °C) (Temporal)   Resp 16   Ht 5' 4.25\" (1.632 m)   Wt 71.2 kg (157 lb)   SpO2 98%   BMI 26.74 kg/m²     Physical Exam  Vitals and nursing note reviewed.   Constitutional:       General: She is not in acute distress.     Appearance: Normal appearance. She is not ill-appearing, toxic-appearing or diaphoretic.   HENT:      Head: Normocephalic and atraumatic.      Right Ear: Tympanic membrane, ear canal and external ear normal. There is no impacted cerumen.      Left Ear: Tympanic membrane, ear canal and external ear normal. There is no impacted cerumen.      Ears:      Comments: Didn't pass hearing whisper test on the right side     Nose: Nose normal. No congestion or rhinorrhea.      Mouth/Throat:      Mouth: Mucous membranes are moist.      Pharynx: Oropharynx is clear. No oropharyngeal exudate or posterior oropharyngeal erythema.   Eyes:      Extraocular Movements: Extraocular movements intact.      Pupils: Pupils are equal, round, and reactive to light.   Neck:      Vascular: No carotid bruit.   Cardiovascular:      Rate and Rhythm: Normal rate and regular rhythm.      Pulses: Normal pulses.      Heart sounds: Normal heart sounds. No murmur heard.  No friction rub. No gallop.    Pulmonary:      Effort: Pulmonary effort is normal. No respiratory distress.      Breath sounds: Normal breath sounds. No stridor. No wheezing, rhonchi or rales.   Chest:      Chest wall: No tenderness.   Abdominal:      General: Bowel sounds are normal. There is no distension.      Palpations: Abdomen is soft. There is no mass.      Tenderness: There is no abdominal tenderness. There is no guarding or rebound.      Hernia: No hernia is present.   Genitourinary:     Comments: Under care of a GYNE  Musculoskeletal:         General: Tenderness (bilateral knees) present. No swelling, deformity or signs of injury. Normal range of motion.      Cervical back: Normal range of motion and neck supple. No rigidity or tenderness.       Right lower leg: No edema.      Left lower leg: No edema.   Lymphadenopathy:      Cervical: No cervical adenopathy.   Skin:     General: Skin is warm and dry.      Capillary Refill: Capillary refill takes less than 2 seconds.   Neurological:      General: No focal deficit present.      Mental Status: She is alert and oriented to person, place, and time.      Sensory: Sensory deficit (hearing deficit on right side) present.   Psychiatric:         Mood and Affect: Mood normal.         Behavior: Behavior normal.         Thought Content: Thought content normal.         Judgment: Judgment normal.         Laboratory  I have reviewed the pertinent laboratory tests on 10/12/20. These are the pertinent findings:  * borderline low HDL 49, borderline high Tryglycerides 199    Imaging  I have reviewed the pertinent imaging study reports on 09/07/21. These are the pertinent findings:  * Normal mammogram    Assessment & Plan   Diagnoses and associated orders for this visit:  1. Mixed hyperlipidemia  Assessment & Plan:  On Atorvastatin 20 mg/day  Physically active, follows low fat/cholesterol diets  Lipid Panel today  Pending results  2. Medicare annual wellness visit, subsequent  Hearing deficit on right side  F/u with ENT in May 2022 when comes back from [place]  Follow low fat/cholesterol diets, watch simple sugars and carbs, increase physical activities to 30 min/day 5 times a week    Dejah OLVERA  [facility]  [address] [place] [address]  [phone]                 Medicare Wellness Visit  Plan for Preventive Care    A good way for you to stay healthy is to use preventive care.  Medicare covers many services that can help you stay healthy.* The goal of these services is to find any health problems as quickly as possible. Finding problems early can help make them easier to treat.  Your personal plan below lists the services you may need and when they are due.     Health Maintenance  Summary     Shingles Vaccine (1 of 2)  Overdue - never done    Colorectal Cancer Screen- (Colonoscopy - Every 10 Years)  Postponed until 1/29/2029    Medicare Wellness Visit (Yearly)  Next due on 11/30/2022    Depression Screening (Yearly)  Next due on 11/30/2022    Breast Cancer Screening (Every 2 Years)  Next due on 9/7/2023    DTaP/Tdap/Td Vaccine (2 - Td or Tdap)  Next due on 10/12/2030    Pneumococcal Vaccine 65+   Completed    Osteoporosis Screening   Completed    Hepatitis C Screening   Completed    Influenza Vaccine   Completed    COVID-19 Vaccine   Completed    Hepatitis B Vaccine   Aged Out    Meningococcal Vaccine   Aged Out    HPV Vaccine   Aged Out           Preventive Care for Women and Men    Heart Screenings (Cardiovascular):  · Blood tests are used to check your cholesterol, lipid and triglyceride levels. High levels can increase your risk for heart disease and stroke. High levels can be treated with medications, diet and exercise. Lowering your levels can help keep your heart and blood vessels healthy.  Your provider will order these tests if they are needed.    · An ultrasound is done to see if you have an abdominal aortic aneurysm (AAA).  This is an enlargement of one of the main blood vessels that delivers blood to the body.   In the [place], 9,000 deaths are caused by AAA.  You may not even know you have this problem and as many as 1 in 3 people will have a serious problem if it is not treated.  Early diagnosis allows for more effective treatment and cure.  If you have a family history of AAA or are a male age 65-75 who has smoked, you are at higher risk of an AAA.  Your provider can order this test, if needed.    Colorectal Screening:  · There are many tests that are used to check for cancer of your colon and rectum. You and your provider should discuss what test is best for you and when to have it done.  Options include:  · Screening Colonoscopy: exam of the entire colon, seen through a  flexible lighted tube.  · Flexible Sigmoidoscopy: exam of the last third (sigmoid portion) of the colon and rectum, seen through a flexible lighted tube.  · Cologuard DNA stool test: a sample of your stool is used to screen for cancer and unseen blood in your stool.  · Fecal Occult Blood Test: a sample of your stool is studied to find any unseen blood    Flu Shot:  · An immunization that helps to prevent influenza (the flu). You should get this every year. The best time to get the shot is in the fall.    Pneumococcal Shot:  • Vaccines are available that can help prevent pneumococcal disease, which is any type of infection caused by Streptococcus pneumoniae bacteria.   Their use can prevent some cases of pneumonia, meningitis, and sepsis. There are two types of pneumococcal vaccines:   o Conjugate vaccines (PCV-13 or Prevnar 13®) - helps protect against the 13 types of pneumococcal bacteria that are the most common causes of serious infections in children and adults.    o Polysaccharide vaccine (PPSV23 or Beppatqmk71®) - helps protect against 23 types of pneumococcal bacteria for patients who are recommended to get it.  These vaccines should be given at least 12 months apart.  A booster is usually not needed.     Hepatitis B Shot:  · An immunization that helps to protect people from getting Hepatitis B. Hepatitis B is a virus that spreads through contact with infected blood or body fluids. Many people with the virus do not have symptoms.  The virus can lead to serious problems, such as liver disease. Some people are at higher risk than others. Your doctor will tell you if you need this shot.     Diabetes Screening:  · A test to measure sugar (glucose) in your blood is called a fasting blood sugar. Fasting means you cannot have food or drink for at least 8 hours before the test. This test can detect diabetes long before you may notice symptoms.    Glaucoma Screening:  · Glaucoma screening is performed by your eye  doctor. The test measures the fluid pressure inside your eyes to determine if you have glaucoma.     Hepatitis C Screening:  · A blood test to see if you have the hepatitis C virus.  Hepatitis C attacks the liver and is a major cause of chronic liver disease.  Medicare will cover a single screening for all adults born between 1945 & 1965, or high risk patients (people who have injected illegal drugs or people who have had blood transfusions).  High risk patients who continue to inject illegal drugs can be screened for Hepatitis C every year.    Smoking and Tobacco-Use Cessation Counseling:  · Tobacco is the single greatest cause of disease and early death in our country today. Medication and counseling together can increase a person’s chance of quitting for good.   · Medicare covers two quitting attempts per year, with four counseling sessions per attempt (eight sessions in a 12 month period)    Preventive Screening tests for Women    Screening Mammograms and Breast Exams:  · An x-ray of your breasts to check for breast cancer before you or your doctor may be able to feel it.  If breast cancer is found early it can usually be treated with success.    Pelvic Exams and Pap Tests:  · An exam to check for cervical and vaginal cancer. A Pap test is a lab test in which cells are taken from your cervix and sent to the lab to look for signs of cervical cancer. If cancer of the cervix is found early, chances for a cure are good. Testing can generally end at age 65, or if a woman has a hysterectomy for a benign condition. Your provider may recommend more frequent testing if certain abnormal results are found.    Bone Mass Measurements:  · A painless x-ray of your bone density to see if you are at risk for a broken bone. Bone density refers to the thickness of bones or how tightly the bone tissue is packed.    Preventive Screening tests for Men    Prostate Screening:  · Should you have a prostate cancer test (PSA)?  It is up  to you to decide if you want a prostate cancer test. Talk to your clinician to find out if the test is right for you.  Things for you to consider and talk about should include:  · Benefits and harms of the test  · Your family history  · How your race/ethnicity may influence the test  · If the test may impact other medical conditions you have  · Your values on screenings and treatments    *Medicare pays for many preventive services to keep you healthy. For some of these services, you might have to pay a deductible, coinsurance, and / or copayment.  The amounts vary depending on the type of services you need and the kind of Medicare health plan you have.    For further details on screenings offered by Medicare please visit: https://www.medicare.gov/coverage/preventive-screening-services    ER Physician: Jeovany Bautista	  INTERPRETATION: Large amount of stool, nonobstructive bowel/gas pattern. No free air.

## 2024-02-19 NOTE — ED ADULT NURSE NOTE - NS ED NURSE LEVEL OF CONSCIOUSNESS AFFECT
Karl is a 57 year old who is being evaluated via a billable telephone visit.      What phone number would you like to be contacted at? [phone]   How would you like to obtain your AVS? [facility]           Assessment and Plan:     Atrial fibrillation, persistent (H)  Diagnosed during preoperative visit and NOT postoperatively. Recommend anticoagulation with an DOAC such as Eliquis. [id number] score is only 1, but patient is at higher risk due to possibly undiagnosed sleep apnea in the setting of morbid obesity and prediabetes. Based on   - REVIEW OF HEALTH MAINTENANCE PROTOCOL ORDERS  - Adult Cardiology Eval  Referral; Future  - Adult Sleep Eval & Management  Referral; Future  - apixaban ANTICOAGULANT (ELIQUIS) 5 MG tablet; Take 1 tablet (5 mg) by mouth 2 times daily    Hypertension goal BP (blood pressure) < 140/90  Controlled with current regimen consisting of Losartan 25 mg/day, Toprol XL 25 mg/day and dietary restrictions.    Insomnia, unspecified type  Situational, hence, recommend short-term sedative hypnotic drug.  - zolpidem (AMBIEN) 10 MG tablet; Take 1 tablet (10 mg) by mouth nightly as needed for sleep    Sensation of swollen throat  Etiology cannot be ascertain as to whether it's from his recent procedure or due to Losartan. For the benefit, hold Losartan. If no improvement within 7 days, then restart Losartan.         Interval History:     Atrial fibrillation follow-up   Symptoms: Shortness of Breath but nothing severe which is intermittent, associated with cough without dizziness, chest pain nor palpitations.  Tests: EKG during preoperative visit last 1/29/2024 shows new-onset atrial fibrillation. Chest x-rays last 1/29/2024 shows no pulmonary edema. Echocardiogram last 2/5/2024 shows mild concentric LVH, preserved LVEF, but severely dilated left atrium and moderate dilated right atrium.  Stroke prevention: None. Deferred  due to surgery last 2/5/2024.  Co-morbidities: Hypertension, morbid obesity, history of alcoholism, prediabetes and suspected sleep apnea. No coronary artery disease, but no recent tests such as CT coronary angiography.  Complications: None. Even postoperatively, the patient did not develop stroke-like symptoms.        11/10/2022     3:46 PM 12/14/2022    12:04 PM 11/16/2023    10:09 AM 1/29/2024     3:09 PM   Date   Pulse 99 90 81 75     Current [id number] Score: 1 point, which represents a 0.6% annual risk of major embolic event, without anti-coagulation or an LAAO device.             Significant Problems:     Patient Active Problem List   Diagnosis    Hidradenitis suppurativa    Intrinsic atopic dermatitis    Edema of both lower extremities due to peripheral venous insufficiency    Dermatitis, seborrheic    Benign prostatic hyperplasia (BPH) with urinary urgency    Tobacco use disorder    Atrial fibrillation, unspecified type (H)              Review of Systems:     CONSTITUTIONAL: NEGATIVE for fever, chills, change in weight  INTEGUMENTARY/SKIN: NEGATIVE for worrisome rashes, moles or lesions  EYES: NEGATIVE for vision changes or irritation  ENT/MOUTH: POSITIVE for sensation of swollen throat.   RESP: NEGATIVE for significant cough or SOB  CV: POSITIVE for chronic hypertension, that is reportedly well-controlled preoperatively.  GI: NEGATIVE for nausea, abdominal pain, heartburn, or change in bowel habits  : NEGATIVE for frequency, dysuria, or hematuria  MUSCULOSKELETAL:POSITIVE  for postoperative right shoulder pain.  NEURO: NEGATIVE for weakness, dizziness or paresthesias  ENDOCRINE: NEGATIVE for temperature intolerance, skin/hair changes  HEME: NEGATIVE for bleeding problems  PSYCHIATRIC: POSITIVE for insomnia since patient currently sleeps in a recliner due to his recent right rotator cuff surgery.             Physical Exam:   Vital signs and physical exam not obtained due to nature of visit.           Data:   As above.     Disposition:  Follow-up in two weeks or as needed.    Torsten Thomas MD  Internal Medicine  [facility] Team    Phone call duration: 30 minutes  Start: 11:30 AM  End: 12:00 PM   Calm